# Patient Record
Sex: MALE | Race: WHITE | NOT HISPANIC OR LATINO | Employment: FULL TIME | URBAN - METROPOLITAN AREA
[De-identification: names, ages, dates, MRNs, and addresses within clinical notes are randomized per-mention and may not be internally consistent; named-entity substitution may affect disease eponyms.]

---

## 2018-06-14 ENCOUNTER — OFFICE VISIT - HEALTHEAST (OUTPATIENT)
Dept: SURGERY | Facility: CLINIC | Age: 58
End: 2018-06-14

## 2018-06-14 DIAGNOSIS — K40.90 UNILATERAL INGUINAL HERNIA WITHOUT OBSTRUCTION OR GANGRENE, RECURRENCE NOT SPECIFIED: ICD-10-CM

## 2018-06-14 ASSESSMENT — MIFFLIN-ST. JEOR: SCORE: 1887.38

## 2018-12-17 ENCOUNTER — ANESTHESIA - HEALTHEAST (OUTPATIENT)
Dept: SURGERY | Facility: AMBULATORY SURGERY CENTER | Age: 58
End: 2018-12-17

## 2018-12-17 ENCOUNTER — OFFICE VISIT - HEALTHEAST (OUTPATIENT)
Dept: FAMILY MEDICINE | Facility: CLINIC | Age: 58
End: 2018-12-17

## 2018-12-17 DIAGNOSIS — Z12.11 SCREEN FOR COLON CANCER: ICD-10-CM

## 2018-12-17 DIAGNOSIS — Z13.220 SCREENING FOR LIPID DISORDERS: ICD-10-CM

## 2018-12-17 DIAGNOSIS — Z01.818 PREOP EXAMINATION: ICD-10-CM

## 2018-12-17 DIAGNOSIS — R03.0 ELEVATED BLOOD PRESSURE READING WITHOUT DIAGNOSIS OF HYPERTENSION: ICD-10-CM

## 2018-12-17 DIAGNOSIS — K40.90 NON-RECURRENT UNILATERAL INGUINAL HERNIA WITHOUT OBSTRUCTION OR GANGRENE: ICD-10-CM

## 2018-12-17 DIAGNOSIS — K76.0 FATTY LIVER: ICD-10-CM

## 2018-12-17 DIAGNOSIS — R05.3 CHRONIC COUGH: ICD-10-CM

## 2018-12-17 LAB
ALBUMIN SERPL-MCNC: 4 G/DL (ref 3.5–5)
ALP SERPL-CCNC: 77 U/L (ref 45–120)
ALT SERPL W P-5'-P-CCNC: 21 U/L (ref 0–45)
ANION GAP SERPL CALCULATED.3IONS-SCNC: 10 MMOL/L (ref 5–18)
AST SERPL W P-5'-P-CCNC: 18 U/L (ref 0–40)
BILIRUB SERPL-MCNC: 0.4 MG/DL (ref 0–1)
BUN SERPL-MCNC: 16 MG/DL (ref 8–22)
CALCIUM SERPL-MCNC: 9.6 MG/DL (ref 8.5–10.5)
CHLORIDE BLD-SCNC: 106 MMOL/L (ref 98–107)
CHOLEST SERPL-MCNC: 216 MG/DL
CO2 SERPL-SCNC: 26 MMOL/L (ref 22–31)
CREAT SERPL-MCNC: 1 MG/DL (ref 0.7–1.3)
ERYTHROCYTE [DISTWIDTH] IN BLOOD BY AUTOMATED COUNT: 12 % (ref 11–14.5)
FASTING STATUS PATIENT QL REPORTED: YES
GFR SERPL CREATININE-BSD FRML MDRD: >60 ML/MIN/1.73M2
GLUCOSE BLD-MCNC: 98 MG/DL (ref 70–125)
HCT VFR BLD AUTO: 44.3 % (ref 40–54)
HDLC SERPL-MCNC: 44 MG/DL
HGB BLD-MCNC: 14.8 G/DL (ref 14–18)
LDLC SERPL CALC-MCNC: 150 MG/DL
MCH RBC QN AUTO: 29.3 PG (ref 27–34)
MCHC RBC AUTO-ENTMCNC: 33.5 G/DL (ref 32–36)
MCV RBC AUTO: 87 FL (ref 80–100)
PLATELET # BLD AUTO: 213 THOU/UL (ref 140–440)
PMV BLD AUTO: 6.8 FL (ref 7–10)
POTASSIUM BLD-SCNC: 5 MMOL/L (ref 3.5–5)
PROT SERPL-MCNC: 7 G/DL (ref 6–8)
RBC # BLD AUTO: 5.06 MILL/UL (ref 4.4–6.2)
SODIUM SERPL-SCNC: 142 MMOL/L (ref 136–145)
TRIGL SERPL-MCNC: 109 MG/DL
WBC: 5.2 THOU/UL (ref 4–11)

## 2018-12-17 ASSESSMENT — MIFFLIN-ST. JEOR: SCORE: 1893.17

## 2018-12-18 ENCOUNTER — SURGERY - HEALTHEAST (OUTPATIENT)
Dept: SURGERY | Facility: AMBULATORY SURGERY CENTER | Age: 58
End: 2018-12-18

## 2018-12-18 ENCOUNTER — COMMUNICATION - HEALTHEAST (OUTPATIENT)
Dept: FAMILY MEDICINE | Facility: CLINIC | Age: 58
End: 2018-12-18

## 2018-12-18 ASSESSMENT — MIFFLIN-ST. JEOR: SCORE: 1892.72

## 2020-01-29 ENCOUNTER — OFFICE VISIT - HEALTHEAST (OUTPATIENT)
Dept: FAMILY MEDICINE | Facility: CLINIC | Age: 60
End: 2020-01-29

## 2020-01-29 DIAGNOSIS — I10 HYPERTENSION, UNSPECIFIED TYPE: ICD-10-CM

## 2020-01-29 DIAGNOSIS — Z12.11 SCREEN FOR COLON CANCER: ICD-10-CM

## 2020-04-20 ENCOUNTER — COMMUNICATION - HEALTHEAST (OUTPATIENT)
Dept: FAMILY MEDICINE | Facility: CLINIC | Age: 60
End: 2020-04-20

## 2020-04-20 DIAGNOSIS — I10 HYPERTENSION, UNSPECIFIED TYPE: ICD-10-CM

## 2020-04-23 ENCOUNTER — COMMUNICATION - HEALTHEAST (OUTPATIENT)
Dept: FAMILY MEDICINE | Facility: CLINIC | Age: 60
End: 2020-04-23

## 2020-07-14 ENCOUNTER — OFFICE VISIT - HEALTHEAST (OUTPATIENT)
Dept: FAMILY MEDICINE | Facility: CLINIC | Age: 60
End: 2020-07-14

## 2020-07-14 DIAGNOSIS — I10 HYPERTENSION, UNSPECIFIED TYPE: ICD-10-CM

## 2020-10-05 ENCOUNTER — COMMUNICATION - HEALTHEAST (OUTPATIENT)
Dept: FAMILY MEDICINE | Facility: CLINIC | Age: 60
End: 2020-10-05

## 2020-10-05 DIAGNOSIS — I10 HYPERTENSION, UNSPECIFIED TYPE: ICD-10-CM

## 2020-10-06 ENCOUNTER — COMMUNICATION - HEALTHEAST (OUTPATIENT)
Dept: FAMILY MEDICINE | Facility: CLINIC | Age: 60
End: 2020-10-06

## 2020-10-06 DIAGNOSIS — I10 HYPERTENSION, UNSPECIFIED TYPE: ICD-10-CM

## 2021-03-05 ENCOUNTER — COMMUNICATION - HEALTHEAST (OUTPATIENT)
Dept: FAMILY MEDICINE | Facility: CLINIC | Age: 61
End: 2021-03-05

## 2021-03-05 DIAGNOSIS — I10 HYPERTENSION, UNSPECIFIED TYPE: ICD-10-CM

## 2021-03-15 ENCOUNTER — OFFICE VISIT - HEALTHEAST (OUTPATIENT)
Dept: FAMILY MEDICINE | Facility: CLINIC | Age: 61
End: 2021-03-15

## 2021-03-15 DIAGNOSIS — I10 HYPERTENSION, UNSPECIFIED TYPE: ICD-10-CM

## 2021-03-15 LAB
ANION GAP SERPL CALCULATED.3IONS-SCNC: 10 MMOL/L (ref 5–18)
BUN SERPL-MCNC: 21 MG/DL (ref 8–22)
CALCIUM SERPL-MCNC: 9.4 MG/DL (ref 8.5–10.5)
CHLORIDE BLD-SCNC: 106 MMOL/L (ref 98–107)
CHOLEST SERPL-MCNC: 238 MG/DL
CO2 SERPL-SCNC: 27 MMOL/L (ref 22–31)
CREAT SERPL-MCNC: 1.53 MG/DL (ref 0.7–1.3)
FASTING STATUS PATIENT QL REPORTED: NO
GFR SERPL CREATININE-BSD FRML MDRD: 46 ML/MIN/1.73M2
GLUCOSE BLD-MCNC: 92 MG/DL (ref 70–125)
HDLC SERPL-MCNC: 37 MG/DL
LDLC SERPL CALC-MCNC: 150 MG/DL
POTASSIUM BLD-SCNC: 4.6 MMOL/L (ref 3.5–5)
SODIUM SERPL-SCNC: 143 MMOL/L (ref 136–145)
TRIGL SERPL-MCNC: 255 MG/DL

## 2021-03-15 RX ORDER — TRIAMTERENE AND HYDROCHLOROTHIAZIDE 37.5; 25 MG/1; MG/1
CAPSULE ORAL
Qty: 90 CAPSULE | Refills: 3 | Status: SHIPPED | OUTPATIENT
Start: 2021-03-15 | End: 2022-05-18

## 2021-03-15 RX ORDER — LISINOPRIL 10 MG/1
TABLET ORAL
Qty: 90 TABLET | Refills: 3 | Status: SHIPPED | OUTPATIENT
Start: 2021-03-15 | End: 2022-05-18

## 2021-03-16 ENCOUNTER — COMMUNICATION - HEALTHEAST (OUTPATIENT)
Dept: FAMILY MEDICINE | Facility: CLINIC | Age: 61
End: 2021-03-16

## 2021-03-31 ENCOUNTER — AMBULATORY - HEALTHEAST (OUTPATIENT)
Dept: NURSING | Facility: CLINIC | Age: 61
End: 2021-03-31

## 2021-04-21 ENCOUNTER — AMBULATORY - HEALTHEAST (OUTPATIENT)
Dept: NURSING | Facility: CLINIC | Age: 61
End: 2021-04-21

## 2021-06-01 VITALS — HEIGHT: 72 IN | BODY MASS INDEX: 31.72 KG/M2 | WEIGHT: 234.2 LBS

## 2021-06-02 VITALS — BODY MASS INDEX: 33.32 KG/M2 | HEIGHT: 71 IN | WEIGHT: 238 LBS

## 2021-06-02 VITALS — WEIGHT: 238.1 LBS | BODY MASS INDEX: 33.33 KG/M2 | HEIGHT: 71 IN

## 2021-06-04 VITALS
DIASTOLIC BLOOD PRESSURE: 88 MMHG | SYSTOLIC BLOOD PRESSURE: 166 MMHG | WEIGHT: 257.7 LBS | HEART RATE: 84 BPM | BODY MASS INDEX: 36.2 KG/M2

## 2021-06-05 VITALS
SYSTOLIC BLOOD PRESSURE: 106 MMHG | HEART RATE: 88 BPM | WEIGHT: 248.3 LBS | DIASTOLIC BLOOD PRESSURE: 62 MMHG | BODY MASS INDEX: 34.88 KG/M2

## 2021-06-05 NOTE — PROGRESS NOTES
ASSESSMENT:  1. Screen for colon cancer  Patient is due for colorectal cancer screening, but he declines at this time.    2. Hypertension  New diagnosis of the patient's blood pressure has been elevated for well over a year.  - triamterene-hydrochlorothiazide (DYAZIDE) 37.5-25 mg per capsule; Take one pill each morning for high blood pressure  Dispense: 90 capsule; Refill: 0        PLAN:  1.  Begin Dyazide 37.5/25 of note the patient's sister is on the same medication.  2.  Patient declines colonoscopy at this time.  3.  Patient will schedule a physical in the next several months he will check his blood pressure in the meantime.       No orders of the defined types were placed in this encounter.    Medications Discontinued During This Encounter   Medication Reason     HYDROcodone-acetaminophen 5-325 mg per tablet      ibuprofen (ADVIL,MOTRIN) 200 MG tablet Therapy completed       Return in about 3 months (around 4/29/2020) for establish care with someone .    CHIEF COMPLAINT:  Chief Complaint   Patient presents with     Blood Pressure Check     has been high recently        SUBJECTIVE:  Paresh is a 59 y.o. male who presents to the clinic for blood pressure management.  His blood pressure has been running high for the past times they have been measure. Wife and Paresh state that his diet is decent. He does drink soda regularly. Patient called his sister, who has hypertension, to ask what medication she is on. She is taking triamterene. He mentions that at times his face becomes red and wonders if that is due to high blood pressure.    He wishes to have Dr. Santiago as primary provider.    REVIEW OF SYSTEMS:   Please see above.   All other systems are negative.    PFSH:  Mom had diabetes Type 2. Dad had prostate cancer. He believes some of his siblings are on high blood pressure medication. He is accompanied today by his wife.     There is no immunization history on file for this patient.  Social History     Socioeconomic  History     Marital status:      Spouse name: Not on file     Number of children: 1     Years of education: Not on file     Highest education level: Not on file   Occupational History     Occupation:    Social Needs     Financial resource strain: Not on file     Food insecurity:     Worry: Not on file     Inability: Not on file     Transportation needs:     Medical: Not on file     Non-medical: Not on file   Tobacco Use     Smoking status: Never Smoker     Smokeless tobacco: Never Used   Substance and Sexual Activity     Alcohol use: Yes     Alcohol/week: 3.0 standard drinks     Types: 3 Standard drinks or equivalent per week     Comment: Occasional      Drug use: No     Sexual activity: Yes     Partners: Female   Lifestyle     Physical activity:     Days per week: Not on file     Minutes per session: Not on file     Stress: Not on file   Relationships     Social connections:     Talks on phone: Not on file     Gets together: Not on file     Attends Jainism service: Not on file     Active member of club or organization: Not on file     Attends meetings of clubs or organizations: Not on file     Relationship status: Not on file     Intimate partner violence:     Fear of current or ex partner: Not on file     Emotionally abused: Not on file     Physically abused: Not on file     Forced sexual activity: Not on file   Other Topics Concern     Not on file   Social History Narrative    Diet-        Exercise-     History reviewed. No pertinent past medical history.  Family History   Problem Relation Age of Onset     Diabetes type II Mother      CABG Mother      Pancreatic cancer Father      Prostate cancer Father      Hypertension Sister         Dyazide     Diabetes type II Brother      Hypertension Brother        MEDICATIONS:  Current Outpatient Medications   Medication Sig Dispense Refill     ascorbic acid, vitamin C, (ASCORBIC ACID WITH CASIE HIPS) 500 MG tablet Take 500 mg by mouth daily.        aspirin 325 MG tablet Take 325 mg by mouth daily.       calcium-vitamin D (CALCIUM-VITAMIN D) 500 mg(1,250mg) -200 unit per tablet Take 1 tablet by mouth daily.       NIACIN ORAL Take 1 tablet by mouth daily.       triamterene-hydrochlorothiazide (DYAZIDE) 37.5-25 mg per capsule Take one pill each morning for high blood pressure 90 capsule 0     No current facility-administered medications for this visit.        TOBACCO USE:  Social History     Tobacco Use   Smoking Status Never Smoker   Smokeless Tobacco Never Used       VITALS:  Vitals:    01/29/20 1510 01/29/20 1541   BP: 166/88 166/88   Pulse: 84    Weight: (!) 257 lb 11.2 oz (116.9 kg)      Wt Readings from Last 3 Encounters:   01/29/20 (!) 257 lb 11.2 oz (116.9 kg)   12/18/18 (!) 238 lb (108 kg)   12/17/18 (!) 238 lb 1.6 oz (108 kg)       PHYSICAL EXAM:  Constitutional:   Reveals a male appearing his stated age.  Vitals: per nursing notes.  Head: Normocephalic, Atraumatic.  Musculoskeletal: No peripheral swelling.  Neuro:  Alert and oriented. Cranial nerves, motor, sensory exams are intact.  No gross focal deficits.  Psychiatric:  Memory intact, mood appropriate.      DATA REVIEWED:  Additional History from Old Records Summarized (2): None  Decision to Obtain Records (1): None  Radiology Tests Summarized or Ordered (1): None  Labs Reviewed or Ordered (1): None  Medicine Test Summarized or Ordered (1): None  Independent Review of EKG, X-RAY, or RAPID STREP (2 each): None    The visit lasted a total of 20 minutes face to face with the patient. Over 50% of the time was spent counseling and educating the patient about hypertension, management, diet, and exercise.    ITanisha, am scribing for and in the presence of, Dr. Holley.    I, Dr. Holley, personally performed the services described in this documentation, as scribed by Tanisha Bennett in my presence, and it is both accurate and complete.    Total data points: 0

## 2021-06-07 NOTE — TELEPHONE ENCOUNTER
RN cannot approve Refill Request    RN can NOT refill this medication PCP please review for continued use. Last office visit: 1/29/2020 Sae Holley MD Last Physical: 12/17/2018 Last MTM visit: Visit date not found Last visit same specialty: 1/29/2020 Sae Holley MD.  Next visit within 3 mo: Visit date not found  Next physical within 3 mo: Visit date not found      Ibis MURILLO Ian, [facility] Triage/Med Refill 4/22/2020    Requested Prescriptions   Pending Prescriptions Disp Refills     triamterene-hydrochlorothiazide (DYAZIDE) 37.5-25 mg per capsule 90 capsule 3     Sig: Take one pill each morning for high blood pressure       Diuretics/Combination Diuretics Refill Protocol  Passed - 4/20/2020 12:51 PM        Passed - Visit with PCP or prescribing provider visit in past 12 months     Last office visit with prescriber/PCP: 1/29/2020 Sae Holley MD OR same dept: 1/29/2020 Sae Holley MD OR same specialty: 1/29/2020 Sae Holley MD  Last physical: 12/17/2018 Last MTM visit: Visit date not found   Next visit within 3 mo: Visit date not found  Next physical within 3 mo: Visit date not found  Prescriber OR PCP: Sae Holley MD  Last diagnosis associated with med order: 1. Hypertension, unspecified type  - triamterene-hydrochlorothiazide (DYAZIDE) 37.5-25 mg per capsule; Take one pill each morning for high blood pressure  Dispense: 90 capsule; Refill: 0    If protocol passes may refill for 12 months if within 3 months of last provider visit (or a total of 15 months).             Passed - Serum Potassium in past 12 months      Lab Results   Component Value Date    Potassium 3.9 02/10/2020             Passed - Serum Sodium in past 12 months      Lab Results   Component Value Date    Sodium 143 02/10/2020             Passed - Blood pressure on file in past 12 months     BP Readings from Last 1 Encounters:   02/11/20 133/81             Passed - Serum Creatinine in past 12 months       Creatinine   Date Value Ref Range Status   02/10/2020 1.18 0.70 - 1.30 mg/dL Final

## 2021-06-09 NOTE — PROGRESS NOTES
"Paresh Cuadra is a 60 y.o. male who is being evaluated via a billable telephone visit.      The patient has been notified of following:     \"This telephone visit will be conducted via a call between you and your physician/provider. We have found that certain health care needs can be provided without the need for a physical exam.  This service lets us provide the care you need with a short phone conversation.  If a prescription is necessary we can send it directly to your pharmacy.  If lab work is needed we can place an order for that and you can then stop by our lab to have the test done at a later time.    Telephone visits are billed at different rates depending on your insurance coverage. During this emergency period, for some insurers they may be billed the same as an in-person visit.  Please reach out to your insurance provider with any questions.    If during the course of the call the physician/provider feels a telephone visit is not appropriate, you will not be charged for this service.\"    Patient has given verbal consent to a Telephone visit? Yes    What phone number would you like to be contacted at?  See Above     Patient would like to receive their AVS by AVS Preference: Mail a copy.    Additional provider notes: This is a telephone visit that began at 3:38 PM and ended at 3:46 PM.    I started the patient on triamterene hydrochlorothiazide back in January, he reports his blood pressures are generally in the 130s to 140s, this is clearly an improvement of the blood pressure values are still running too high, so I told the patient I would like to add a second medication, and I explained that commonly people need to be on more than one blood pressure medication to get adequate control.    He is not having any side effects or difficulties with the Dyazide, additionally he reports that he had some flushing in his face which is now resolved.    No other change in his health status.    Assessment/Plan:  1. " Hypertension  Better though not optimal control.  - lisinopriL (PRINIVIL,ZESTRIL) 10 MG tablet; Take one pill each morning for high blood pressure  Dispense: 90 tablet; Refill: 0  - triamterene-hydrochlorothiazide (DYAZIDE) 37.5-25 mg per capsule; Take one pill each morning for high blood pressure  Dispense: 90 capsule; Refill: 0    PLAN:  1.  Continue the Dyazide which was renewed but add lisinopril 10 mg daily.  2.  Patient is due for a physical in the next 2 to 3 months.        Phone call duration:  8 minutes    Sae Holley MD

## 2021-06-12 NOTE — TELEPHONE ENCOUNTER
Refill Approved    Rx renewed per Medication Renewal Policy. Medication was last renewed on 7/14/20.    Divya Sparks, [facility] Triage/Med Refill 10/9/2020     Requested Prescriptions   Pending Prescriptions Disp Refills     triamterene-hydrochlorothiazide (DYAZIDE) 37.5-25 mg per capsule [Pharmacy Med Name: TRIAMTERENE-HCTZ 37.5-25 MG CP] 90 capsule 0     Sig: TAKE 1 TABLET BY MOUTH EACH MORNING FOR HIGH BLOOD PRESSURE       Diuretics/Combination Diuretics Refill Protocol  Passed - 10/6/2020 12:32 PM        Passed - Visit with PCP or prescribing provider visit in past 12 months     Last office visit with prescriber/PCP: 1/29/2020 Sae Holley MD OR same dept: 1/29/2020 Sae Holley MD OR same specialty: 1/29/2020 Sae Holley MD  Last physical: 12/17/2018 Last MTM visit: Visit date not found   Next visit within 3 mo: Visit date not found  Next physical within 3 mo: Visit date not found  Prescriber OR PCP: Sae Holley MD  Last diagnosis associated with med order: 1. Hypertension, unspecified type  - triamterene-hydrochlorothiazide (DYAZIDE) 37.5-25 mg per capsule [Pharmacy Med Name: TRIAMTERENE-HCTZ 37.5-25 MG CP]; TAKE 1 TABLET BY MOUTH EACH MORNING FOR HIGH BLOOD PRESSURE  Dispense: 90 capsule; Refill: 0    If protocol passes may refill for 12 months if within 3 months of last provider visit (or a total of 15 months).             Passed - Serum Potassium in past 12 months      Lab Results   Component Value Date    Potassium 3.9 02/10/2020             Passed - Serum Sodium in past 12 months      Lab Results   Component Value Date    Sodium 143 02/10/2020             Passed - Blood pressure on file in past 12 months     BP Readings from Last 1 Encounters:   02/11/20 133/81             Passed - Serum Creatinine in past 12 months      Creatinine   Date Value Ref Range Status   02/10/2020 1.18 0.70 - 1.30 mg/dL Final

## 2021-06-12 NOTE — TELEPHONE ENCOUNTER
Refill Approved    Rx renewed per Medication Renewal Policy. Medication was last renewed on 7/14/20.    Divya Sparks, [facility] Triage/Med Refill 10/7/2020     Requested Prescriptions   Pending Prescriptions Disp Refills     lisinopriL (PRINIVIL,ZESTRIL) 10 MG tablet [Pharmacy Med Name: LISINOPRIL 10 MG TABLET] 90 tablet 0     Sig: TAKE 1 TABLET BY MOUTH EACH MORNING FOR HIGH BLOOD PRESSURE       Ace Inhibitors Refill Protocol Passed - 10/5/2020  1:30 PM        Passed - PCP or prescribing provider visit in past 12 months       Last office visit with prescriber/PCP: 1/29/2020 Sae Holley MD OR same dept: 1/29/2020 Sae Holley MD OR same specialty: 1/29/2020 Sae Holley MD  Last physical: 12/17/2018 Last MTM visit: Visit date not found   Next visit within 3 mo: Visit date not found  Next physical within 3 mo: Visit date not found  Prescriber OR PCP: Sae Holley MD  Last diagnosis associated with med order: 1. Hypertension, unspecified type  - lisinopriL (PRINIVIL,ZESTRIL) 10 MG tablet [Pharmacy Med Name: LISINOPRIL 10 MG TABLET]; TAKE 1 TABLET BY MOUTH EACH MORNING FOR HIGH BLOOD PRESSURE  Dispense: 90 tablet; Refill: 0    If protocol passes may refill for 12 months if within 3 months of last provider visit (or a total of 15 months).             Passed - Serum Potassium in past 12 months     Lab Results   Component Value Date    Potassium 3.9 02/10/2020             Passed - Blood pressure filed in past 12 months     BP Readings from Last 1 Encounters:   02/11/20 133/81             Passed - Serum Creatinine in past 12 months     Creatinine   Date Value Ref Range Status   02/10/2020 1.18 0.70 - 1.30 mg/dL Final

## 2021-06-15 NOTE — TELEPHONE ENCOUNTER
Refill Request  Medication name:   Requested Prescriptions     Pending Prescriptions Disp Refills     triamterene-hydrochlorothiazide (DYAZIDE) 37.5-25 mg per capsule 90 capsule 1     Sig: TAKE 1 TABLET BY MOUTH EACH MORNING FOR HIGH BLOOD PRESSURE     Who prescribed the medication: Sae Holley - PCP  Last refill on medication: 10/09/2020  Requested Pharmacy: [facility]  Last appointment with PCP: 07/14/2020 VV  Next appointment: Appointment scheduled for 03/15/2021 - medication check HTN.     I called and spoke with patient, he is scheduled for this month.

## 2021-06-15 NOTE — PROGRESS NOTES
ASSESSMENT:  1. Hypertension  Well-controlled on 2 antihypertensives  - lisinopriL (PRINIVIL,ZESTRIL) 10 MG tablet; TAKE 1 TABLET BY MOUTH EACH MORNING FOR HIGH BLOOD PRESSURE  Dispense: 90 tablet; Refill: 3  - triamterene-hydrochlorothiazide (DYAZIDE) 37.5-25 mg per capsule; TAKE 1 TABLET BY MOUTH EACH MORNING FOR HIGH BLOOD PRESSURE  Dispense: 90 capsule; Refill: 3  - Basic Metabolic Panel  - Lipid Cascade        PLAN:  1.  I renewed the patient's medications without changes.  2.  Laboratory studies as above.  3.  Patient otherwise should be seen in 1 year for a physical.       Orders Placed This Encounter   Procedures     Basic Metabolic Panel     Lipid Cascade     Order Specific Question:   Fasting is required?     Answer:   Yes     Medications Discontinued During This Encounter   Medication Reason     NIACIN ORAL Therapy completed     lisinopriL (PRINIVIL,ZESTRIL) 10 MG tablet Reorder     triamterene-hydrochlorothiazide (DYAZIDE) 37.5-25 mg per capsule Reorder       No follow-ups on file.    CHIEF COMPLAINT:  Chief Complaint   Patient presents with     Medication Management     HTN, no concerns.       SUBJECTIVE:  Paresh is a 60 y.o. male who comes in for follow-up blood pressure.  The patient now is on 2 blood pressure medications his blood pressure is very well controlled.    He is behind on preventive maintenance issues of note he declines colorectal cancer screening even the Cologuard, he does not want a tetanus booster, he will be interested in the Covid vaccine when it is available for him.    Patient has noticed that since starting the blood pressure medication his libido is down, he has had more difficulty also getting and maintaining erections, on the other hand at least at this time he would not be interested in any the erectile medications.    Otherwise no other significant change in his health status.    REVIEW OF SYSTEMS:      All other systems are negative.    PFSH:    There is no immunization  history on file for this patient.  Social History     Socioeconomic History     Marital status:      Spouse name: Not on file     Number of children: 1     Years of education: Not on file     Highest education level: Not on file   Occupational History     Occupation:    Social Needs     Financial resource strain: Not on file     Food insecurity     Worry: Not on file     Inability: Not on file     Transportation needs     Medical: Not on file     Non-medical: Not on file   Tobacco Use     Smoking status: Never Smoker     Smokeless tobacco: Never Used   Substance and Sexual Activity     Alcohol use: Yes     Alcohol/week: 3.0 standard drinks     Types: 3 Standard drinks or equivalent per week     Comment: Occasional      Drug use: No     Sexual activity: Yes     Partners: Female   Lifestyle     Physical activity     Days per week: Not on file     Minutes per session: Not on file     Stress: Not on file   Relationships     Social connections     Talks on phone: Not on file     Gets together: Not on file     Attends Religion service: Not on file     Active member of club or organization: Not on file     Attends meetings of clubs or organizations: Not on file     Relationship status: Not on file     Intimate partner violence     Fear of current or ex partner: Not on file     Emotionally abused: Not on file     Physically abused: Not on file     Forced sexual activity: Not on file   Other Topics Concern     Not on file   Social History Narrative    Diet-        Exercise-     History reviewed. No pertinent past medical history.  Family History   Problem Relation Age of Onset     Diabetes type II Mother      CABG Mother      Pancreatic cancer Father      Prostate cancer Father      Hypertension Sister         Dyazide     Diabetes type II Brother      Hypertension Brother        MEDICATIONS:  Current Outpatient Medications   Medication Sig Dispense Refill     ascorbic acid, vitamin C, (ASCORBIC ACID WITH  CASIE HIPS) 500 MG tablet Take 500 mg by mouth daily.       aspirin 325 MG tablet Take 325 mg by mouth daily.       calcium-vitamin D (CALCIUM-VITAMIN D) 500 mg(1,250mg) -200 unit per tablet Take 1 tablet by mouth daily.       lisinopriL (PRINIVIL,ZESTRIL) 10 MG tablet TAKE 1 TABLET BY MOUTH EACH MORNING FOR HIGH BLOOD PRESSURE 90 tablet 3     triamterene-hydrochlorothiazide (DYAZIDE) 37.5-25 mg per capsule TAKE 1 TABLET BY MOUTH EACH MORNING FOR HIGH BLOOD PRESSURE 90 capsule 3     No current facility-administered medications for this visit.        TOBACCO USE:  Social History     Tobacco Use   Smoking Status Never Smoker   Smokeless Tobacco Never Used       VITALS:  Vitals:    03/15/21 1541   BP: 106/62   Patient Site: Right Arm   Patient Position: Sitting   Cuff Size: Adult Large   Pulse: 88   Weight: (!) 248 lb 4.8 oz (112.6 kg)     Wt Readings from Last 3 Encounters:   03/15/21 (!) 248 lb 4.8 oz (112.6 kg)   01/29/20 (!) 257 lb 11.2 oz (116.9 kg)   12/18/18 (!) 238 lb (108 kg)       PHYSICAL EXAM:  Constitutional:   Reveals a who appears his stated age.  Vitals: per nursing notes  Eyes:  EOMs full, PERRL.  Musculoskeletal: No peripheral swelling.  Neuro:  Alert and oriented. Cranial nerves, motor, sensory exams are intact.  No gross focal deficits.  Psychiatric:  Memory intact, mood appropriate.    QUALITY MEASURES:      DATA REVIEWED:

## 2021-06-16 PROBLEM — R05.3 CHRONIC COUGH: Status: ACTIVE | Noted: 2018-12-17

## 2021-06-16 PROBLEM — E78.5 HYPERLIPIDEMIA: Status: ACTIVE | Noted: 2021-03-16

## 2021-06-16 PROBLEM — I10 HYPERTENSION: Status: ACTIVE | Noted: 2020-01-29

## 2021-06-16 PROBLEM — K76.0 FATTY LIVER: Status: ACTIVE | Noted: 2018-12-17

## 2021-06-18 NOTE — PROGRESS NOTES
"HPI:  I am consulted by the ER in this 58 y.o. male regarding an inguinal hernia. He has noted this for the past  year. He has discomfort and a bulge. The pain is mild.    No Known Allergies      Current Outpatient Prescriptions:      ascorbic acid, vitamin C, (ASCORBIC ACID WITH CASIE HIPS) 500 MG tablet, Take 500 mg by mouth daily., Disp: , Rfl:      aspirin 81 mg chewable tablet, Chew 81 mg daily., Disp: , Rfl:      calcium-vitamin D (CALCIUM-VITAMIN D) 500 mg(1,250mg) -200 unit per tablet, Take 1 tablet by mouth daily., Disp: , Rfl:      ibuprofen (ADVIL,MOTRIN) 200 MG tablet, Take 600-800 mg by mouth every 8 (eight) hours as needed for pain., Disp: , Rfl:      NIACIN ORAL, Take 1 tablet by mouth daily., Disp: , Rfl:     Past Medical History:   Diagnosis Date     Asthma        History reviewed. No pertinent surgical history.    Social History     Social History     Marital status:      Spouse name: N/A     Number of children: N/A     Years of education: N/A     Occupational History     Not on file.     Social History Main Topics     Smoking status: Never Smoker     Smokeless tobacco: Never Used     Alcohol use Yes      Comment: Occasional      Drug use: No     Sexual activity: Not on file     Other Topics Concern     Not on file     Social History Narrative       Review of Systems - Negative except as noted in the HPI    /85 (Patient Site: Right Arm, Patient Position: Sitting, Cuff Size: Adult Large)  Pulse 76  Ht 5' 11.5\" (1.816 m)  Wt (!) 234 lb 3.2 oz (106.2 kg)  SpO2 96%  BMI 32.21 kg/m2  Body mass index is 32.21 kg/(m^2).    EXAM:  GENERAL: Well developed male in no distress.  CARDIAC: RRR w/out murmur   CHEST/LUNG: Clear  ABDOMEN:  Right inguinal hernia extending to the scrotum. The left side is normal. Abdomen soft and nontender.  GENITAL: Both testicles descended without masses      Assessment/Plan: This patient has a  right inguinal hernia. I discussed this at length with him.  I went " over conservative management as well as surgical treatment of this. I will plan on doing this via an open approach. I went over the small risks of surgery including but not limited to bleeding and infection. I discussed the expected recovery time as well. He will have an activity restriction for 4 weeks.   Will get this scheduled.      Tad Alfonso MD  [facility] Department of Surgery

## 2021-06-18 NOTE — PROGRESS NOTES
Hernia education & surgery packet provided to pt.    Stephy Mendosa CMA ([facility])     Ph: [phone]    Fx: [phone]

## 2021-06-21 NOTE — LETTER
Letter by Sae Holley MD at      Author: Sae Holley MD Service: -- Author Type: --    Filed:  Encounter Date: 3/16/2021 Status: (Other)         Paresh Cuadra  [address]  [facility] [address]             March 16, 2021         Dear Mr. Cuadra,    Below are the results from your recent visit: Sugar is good at 92, no diabetes.  Creatinine the kidney test is just slightly elevated this could be from the blood pressure medication itself or other causes we will continue to monitor.  Cholesterol is elevated, I want you to focus as much as possible good diet and exercise to help control.    Resulted Orders   Basic Metabolic Panel   Result Value Ref Range    Sodium 143 136 - 145 mmol/L    Potassium 4.6 3.5 - 5.0 mmol/L    Chloride 106 98 - 107 mmol/L    CO2 27 22 - 31 mmol/L    Anion Gap, Calculation 10 5 - 18 mmol/L    Glucose 92 70 - 125 mg/dL    Calcium 9.4 8.5 - 10.5 mg/dL    BUN 21 8 - 22 mg/dL    Creatinine 1.53 (H) 0.70 - 1.30 mg/dL    GFR MDRD Af Amer 56 (L) >60 mL/min/1.73m2    GFR MDRD Non Af Amer 46 (L) >60 mL/min/1.73m2    Narrative    Fasting Glucose reference range is 70-99 mg/dL per  American Diabetes Association (ADA) guidelines.   Lipid Cascade   Result Value Ref Range    Cholesterol 238 (H) <=199 mg/dL    Triglycerides 255 (H) <=149 mg/dL    HDL Cholesterol 37 (L) >=40 mg/dL    LDL Calculated 150 (H) <=129 mg/dL    Patient Fasting > 8hrs? No             Please call with questions or contact us using [facility].    Sincerely,        Electronically signed by Sae Holley MD

## 2021-06-22 NOTE — PROGRESS NOTES
Preoperative Exam    Scheduled Procedure: Hernia repair   Surgery Date:  12/18/18  Surgery Location: [facility], fax [phone]    Surgeon:  Dr. Tad Alfonso     Assessment/Plan:     1. Screen for colon cancer  Patient is behind on preventive maintenance issues but declines colonoscopy referral at this time.    2. Preop examination  For repair of right inguinal hernia, symptomatic.  - Comprehensive Metabolic Panel  - HM2(CBC w/o Differential)    3. Right  inguinal hernia   Present for at least a year, symptomatic.  - Comprehensive Metabolic Panel  - HM2(CBC w/o Differential)    4. Chronic cough  Chronic and long-standing, responsive nasal saline.    5. Fatty liver  Noted on recent CT scan presumed metabolic.  - Comprehensive Metabolic Panel    6. Screening for lipid disorders     - Lipid Cascade    7. Elevated blood pressure reading without diagnosis of hypertension  Slight elevation of blood pressure.    PLAN:  1.  Patient is behind on preventive maintenance issues including colorectal cancer screening and immunizations but declines today.  2.  Watchful waiting on blood pressure elevation.  3.  CBC, comprehensive metabolic profile and fasting lipid profile.  CBC and Basic Metabolic profile, normal.    4.  Patient is going to schedule a physical in the near future.  5.  It is otherwise cleared for surgery.        Surgical Procedure Risk: Low (reported cardiac risk generally < 1%)  Have you had prior anesthesia?: Yes  Have you or any family members had a previous anesthesia reaction:  No  Do you or any family members have a history of a clotting or bleeding disorder?: No  Cardiac Risk Assessment: no increased risk for major cardiac complications    Patient approved for surgery with general or local anesthesia.    Please Note:  No history of any prior surgical or anesthetic complications.    Functional Status: Independent  Patient plans to recover at home with family.     Subjective:      Paresh KEYS  Khalif is a 58 y.o. male who presents for a preoperative consultation.  Patient is having an inguinal hernia repair tomorrow. He has had the groin hernia for two years and it aches and has gotten bigger. He previously had a ventral hernia repaired. He denies any anesthesia complications during previous surgeries. He notes that he rarely visits the doctor and plans to schedule a physical soon. He had a constant cough which he saw a pulmonologist for. He uses a saline rinse and the cough has resolved. He explains that his face occasionally gets red which applying lotion provides some relief.       All other systems reviewed and are negative, other than those listed in the HPI.    Pertinent History  Do you have difficulty breathing or chest pain after walking up a flight of stairs: No  History of obstructive sleep apnea: No  Steroid use in the last 6 months: No  Frequent Aspirin/NSAID use: Yes: per pt   Prior Blood Transfusion: No  Prior Blood Transfusion Reaction: N/A   If for some reason prior to, during or after the procedure, if it is medically indicated, would you be willing to have a blood transfusion?:  The patient REFUSES transfusion.    Current Outpatient Medications   Medication Sig Dispense Refill     ascorbic acid, vitamin C, (ASCORBIC ACID WITH CASIE HIPS) 500 MG tablet Take 500 mg by mouth daily.       calcium-vitamin D (CALCIUM-VITAMIN D) 500 mg(1,250mg) -200 unit per tablet Take 1 tablet by mouth daily.       ibuprofen (ADVIL,MOTRIN) 200 MG tablet Take 600-800 mg by mouth every 8 (eight) hours as needed for pain.       NIACIN ORAL Take 1 tablet by mouth daily.       No current facility-administered medications for this visit.         No Known Allergies    Patient Active Problem List   Diagnosis     Chronic cough     Fatty liver       History reviewed. No pertinent past medical history.    Past Surgical History:   Procedure Laterality Date     VENTRAL HERNIA REPAIR  2016       Social History  "    Socioeconomic History     Marital status:      Spouse name: Not on file     Number of children: 1     Years of education: Not on file     Highest education level: Not on file   Social Needs     Financial resource strain: Not on file     Food insecurity - worry: Not on file     Food insecurity - inability: Not on file     Transportation needs - medical: Not on file     Transportation needs - non-medical: Not on file   Occupational History     Occupation:    Tobacco Use     Smoking status: Never Smoker     Smokeless tobacco: Never Used   Substance and Sexual Activity     Alcohol use: Yes     Alcohol/week: 1.8 oz     Types: 3 Standard drinks or equivalent per week     Comment: Occasional      Drug use: No     Sexual activity: Yes     Partners: Female   Other Topics Concern     Not on file   Social History Narrative    Diet-        Exercise-       Patient Care Team:  Provider, No Primary Care as PCP - General          Objective:     Vitals:    12/17/18 1043   BP: (!) 144/92   Pulse: 64   Weight: (!) 238 lb 1.6 oz (108 kg)   Height: 5' 10.75\" (1.797 m)         Physical Exam:  Constitutional:   Reveals a male who appears healthy.  Vitals: per nursing notes.  HEENT: Right Ear: External ear normal.   Left Ear: External ear normal.   Nose: Nose normal.   Mouth/Throat: Oropharynx is clear and moist.   Eyes: Conjunctivae and EOM are normal. Pupils are equal, round, and reactive to light. Right eye exhibits no discharge. Left eye exhibits no discharge.   Neck:  Supple, no carotid bruits or adenopathy.  Back:  No spine or CVA pain.  Thorax:  No bony deformities.  Lungs: Clear to A&P without rales or wheezes.  Respiratory effort normal.  Cardiac:   Regular rate and rhythm, normal S1, S2, no murmur or gallop.  Abdomen:  Soft, active bowel sounds without bruits, mass, or tenderness.  Extremities:   No peripheral edema, pulses in the feet intact.    Skin:  No jaundice, peripheral cyanosis or lesions to suggest " malignancy.  Neuro:  Alert and oriented. Cranial nerves, motor, sensory exams are intact.  No gross focal deficits.  Psychiatric:  Memory intact, mood appropriate.    ADDITIONAL HISTORY SUMMARIZED (2): Reviewed initial consult from 6/14/18: Right inguinal hernia.   DECISION TO OBTAIN EXTRA INFORMATION (1): Reviewed CT scan from 6/01/18: Right inguinal hernia, fatty liver.  RADIOLOGY TESTS (1): None.  LABS (1): Ordered labs.   MEDICINE TESTS (1): None.  INDEPENDENT REVIEW (2 each): None.     Total data points = 4    Total time was 17 minutes, greater than 50% counseling and coordinating care regarding the above issues.        There are no Patient Instructions on file for this visit.    EKG: Not performed    Labs:  Recent Results (from the past 24 hour(s))   Comprehensive Metabolic Panel    Collection Time: 12/17/18 11:14 AM   Result Value Ref Range    Sodium 142 136 - 145 mmol/L    Potassium 5.0 3.5 - 5.0 mmol/L    Chloride 106 98 - 107 mmol/L    CO2 26 22 - 31 mmol/L    Anion Gap, Calculation 10 5 - 18 mmol/L    Glucose 98 70 - 125 mg/dL    BUN 16 8 - 22 mg/dL    Creatinine 1.00 0.70 - 1.30 mg/dL    GFR MDRD Af Amer >60 >60 mL/min/1.73m2    GFR MDRD Non Af Amer >60 >60 mL/min/1.73m2    Bilirubin, Total 0.4 0.0 - 1.0 mg/dL    Calcium 9.6 8.5 - 10.5 mg/dL    Protein, Total 7.0 6.0 - 8.0 g/dL    Albumin 4.0 3.5 - 5.0 g/dL    Alkaline Phosphatase 77 45 - 120 U/L    AST 18 0 - 40 U/L    ALT 21 0 - 45 U/L   Lipid Cascade    Collection Time: 12/17/18 11:14 AM   Result Value Ref Range    Cholesterol 216 (H) <=199 mg/dL    Triglycerides 109 <=149 mg/dL    HDL Cholesterol 44 >=40 mg/dL    LDL Calculated 150 (H) <=129 mg/dL    Patient Fasting > 8hrs? Yes    HM2(CBC w/o Differential)    Collection Time: 12/17/18 11:14 AM   Result Value Ref Range    WBC 5.2 4.0 - 11.0 thou/uL    RBC 5.06 4.40 - 6.20 mill/uL    Hemoglobin 14.8 14.0 - 18.0 g/dL    Hematocrit 44.3 40.0 - 54.0 %    MCV 87 80 - 100 fL    MCH 29.3 27.0 - 34.0 pg     MCHC 33.5 32.0 - 36.0 g/dL    RDW 12.0 11.0 - 14.5 %    Platelets 213 140 - 440 thou/uL    MPV 6.8 (L) 7.0 - 10.0 fL         There is no immunization history on file for this patient.      By signing my name below, I, Ranjeet Strickland, attest that this documentation has been prepared under the direction and in the presence of Dr. Sae Holley.  Electronic Signature: Mario Mckeon. 12/17/2018 10:48 AM.    I, Dr. Holley, personally performed the services described in this documentation. All medical record entries made by the scribe were at my direction and in my presence. I have reviewed the chart and discharge instructions (if applicable) and agree that the record reflects my personal performance and is accurate and complete.      Electronically signed by Sae Holley MD 12/17/18 10:44 AM

## 2021-06-22 NOTE — ANESTHESIA CARE TRANSFER NOTE
Last vitals:   Vitals:    12/18/18 1244   BP: 134/65   Pulse: 75   Resp: 16   Temp: 36.3  C (97.3  F)   SpO2: 98%     Patient's level of consciousness is drowsy  Spontaneous respirations: yes  Maintains airway independently: yes  Dentition unchanged: yes  Oropharynx: oropharynx clear of all foreign objects    QCDR Measures:  ASA# 20 - Surgical Safety Checklist: WHO surgical safety checklist completed prior to induction    PQRS# 430 - Adult PONV Prevention: 4558F - Pt received => 2 anti-emetic agents (different classes) preop & intraop  ASA# 8 - Peds PONV Prevention: NA - Not pediatric patient, not GA or 2 or more risk factors NOT present  PQRS# 424 - Sandra-op Temp Management: 4559F - At least one body temp DOCUMENTED => 35.5C or 95.9F within required timeframe  PQRS# 426 - PACU Transfer Protocol: - Transfer of care checklist used  ASA# 14 - Acute Post-op Pain: ASA14B - Patient did NOT experience pain >= 7 out of 10

## 2021-06-22 NOTE — ANESTHESIA POSTPROCEDURE EVALUATION
Patient: Paresh Cuadra  RIGHT INGUINAL HERNIA  Anesthesia type: MAC    Patient location: Phase II Recovery  Last vitals:   Vitals:    12/18/18 1330   BP: 151/83   Pulse: 78   Resp: 16   Temp:    SpO2: 96%     Post vital signs: stable  Level of consciousness: awake and responds to simple questions  Post-anesthesia pain: pain controlled  Post-anesthesia nausea and vomiting: no  Pulmonary: unassisted, return to baseline  Cardiovascular: stable and blood pressure at baseline  Hydration: adequate  Anesthetic events: no    QCDR Measures:  ASA# 11 - Sandra-op Cardiac Arrest: ASA11B - Patient did NOT experience unanticipated cardiac arrest  ASA# 12 - Sandra-op Mortality Rate: ASA12B - Patient did NOT die  ASA# 13 - PACU Re-Intubation Rate: ASA13B - Patient did NOT require a new airway mgmt  ASA# 10 - Composite Anes Safety: ASA10A - No serious adverse event    Additional Notes:

## 2021-06-22 NOTE — ANESTHESIA PREPROCEDURE EVALUATION
Anesthesia Evaluation      Patient summary reviewed     Airway   Mallampati: II   Pulmonary - negative ROS and normal exam                          Cardiovascular - negative ROS and normal exam   Neuro/Psych - negative ROS     Endo/Other - negative ROS   (+) obesity,      GI/Hepatic/Renal - negative ROS      Other findings: Hb 14.8, K 5.0      Dental - normal exam                        Anesthesia Plan  Planned anesthetic: MAC    ASA 2     Anesthetic plan and risks discussed with: patient and spouse    Post-op plan: routine recovery

## 2021-06-26 ENCOUNTER — HEALTH MAINTENANCE LETTER (OUTPATIENT)
Age: 61
End: 2021-06-26

## 2021-10-16 ENCOUNTER — HEALTH MAINTENANCE LETTER (OUTPATIENT)
Age: 61
End: 2021-10-16

## 2022-05-18 DIAGNOSIS — I10 HYPERTENSION, UNSPECIFIED TYPE: ICD-10-CM

## 2022-05-18 RX ORDER — TRIAMTERENE AND HYDROCHLOROTHIAZIDE 37.5; 25 MG/1; MG/1
CAPSULE ORAL
Qty: 90 CAPSULE | Refills: 0 | Status: SHIPPED | OUTPATIENT
Start: 2022-05-18 | End: 2022-07-22

## 2022-05-18 RX ORDER — LISINOPRIL 10 MG/1
TABLET ORAL
Qty: 90 TABLET | Refills: 0 | Status: SHIPPED | OUTPATIENT
Start: 2022-05-18

## 2022-05-18 NOTE — TELEPHONE ENCOUNTER
Reason for Call:  Medication or medication refill:    Do you use a [facility]?  Name of the pharmacy and phone number for the current request:  [facility] [address] [place] [address]  [phone]    Name of the medication requested: lisinopriL (PRINIVIL,ZESTRIL) 10 MG tablet  triamterene-hydrochlorothiazide (DYAZIDE) 37.5-25 mg per capsule      Other request: Pt is requesting a refill - has a med check office visit 07/08/22 - can pt be bridged until then ?    Can we leave a detailed message on this number? Not Applicable    Phone number patient can be reached at: Cell number on file:    Telephone Information:   Mobile [phone]       Best Time: any    Call taken on 5/18/2022 at 9:36 AM by John Wiggins

## 2022-07-22 ENCOUNTER — TELEPHONE (OUTPATIENT)
Dept: FAMILY MEDICINE | Facility: CLINIC | Age: 62
End: 2022-07-22

## 2022-07-22 DIAGNOSIS — I10 HYPERTENSION, UNSPECIFIED TYPE: ICD-10-CM

## 2022-07-22 RX ORDER — TRIAMTERENE AND HYDROCHLOROTHIAZIDE 37.5; 25 MG/1; MG/1
CAPSULE ORAL
Qty: 90 CAPSULE | Refills: 0 | Status: SHIPPED | OUTPATIENT
Start: 2022-07-22

## 2022-07-22 NOTE — TELEPHONE ENCOUNTER
TC received from wife, who is requesting refill of pt's Triamterene-hydrochlorothiazide to be sent to [facility] in [place], [place]. Pt has been out of the medication.  Prescription approved per [facility] Refill Protocol.    Last Written Prescription Date: 5/18/22  Last Fill Quantity: 90,  # refills: 0   Last office visit: 3/15/22     Future Office Visit:   Next 5 appointments (look out 90 days)    Aug 08, 2022 11:00 AM  (Arrive by 10:40 AM)  Provider Visit with Sae Holley MD  [facility] ([facility] ) [address] [facility] [id number] [phone]

## 2022-07-23 ENCOUNTER — HEALTH MAINTENANCE LETTER (OUTPATIENT)
Age: 62
End: 2022-07-23

## 2022-10-01 ENCOUNTER — HEALTH MAINTENANCE LETTER (OUTPATIENT)
Age: 62
End: 2022-10-01

## 2023-05-14 ENCOUNTER — HEALTH MAINTENANCE LETTER (OUTPATIENT)
Age: 63
End: 2023-05-14